# Patient Record
Sex: MALE | Race: BLACK OR AFRICAN AMERICAN | URBAN - METROPOLITAN AREA
[De-identification: names, ages, dates, MRNs, and addresses within clinical notes are randomized per-mention and may not be internally consistent; named-entity substitution may affect disease eponyms.]

---
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
## 2022-02-14 RX ORDER — CLOTRIMAZOLE AND BETAMETHASONE DIPROPIONATE 10; .64 MG/G; MG/G
1 CREAM TOPICAL 2 TIMES DAILY PRN
Qty: 45 G | Refills: 3 | Status: CANCELLED | OUTPATIENT
Start: 2022-02-14

## 2022-02-14 RX ORDER — CLOTRIMAZOLE AND BETAMETHASONE DIPROPIONATE 10; .64 MG/G; MG/G
1 CREAM TOPICAL 2 TIMES DAILY PRN
Qty: 45 G | Refills: 3 | Status: SHIPPED | OUTPATIENT
Start: 2022-02-14

## 2022-03-21 ENCOUNTER — NURSE TRIAGE (OUTPATIENT)
Dept: FAMILY MEDICINE CLINIC | Facility: CLINIC | Age: 22
End: 2022-03-21

## 2022-04-21 RX ORDER — CLOTRIMAZOLE AND BETAMETHASONE DIPROPIONATE 10; .64 MG/G; MG/G
1 CREAM TOPICAL 2 TIMES DAILY PRN
Qty: 45 G | Refills: 3 | Status: SHIPPED | OUTPATIENT
Start: 2022-04-21

## 2022-07-26 ENCOUNTER — NURSE TRIAGE (OUTPATIENT)
Dept: FAMILY MEDICINE CLINIC | Facility: CLINIC | Age: 22
End: 2022-07-26

## 2022-08-01 ENCOUNTER — OFFICE VISIT (OUTPATIENT)
Dept: FAMILY MEDICINE CLINIC | Facility: CLINIC | Age: 22
End: 2022-08-01
Payer: COMMERCIAL

## 2022-08-01 VITALS
TEMPERATURE: 98 F | DIASTOLIC BLOOD PRESSURE: 75 MMHG | HEART RATE: 78 BPM | WEIGHT: 144.19 LBS | HEIGHT: 72 IN | BODY MASS INDEX: 19.53 KG/M2 | SYSTOLIC BLOOD PRESSURE: 115 MMHG

## 2022-08-01 DIAGNOSIS — R10.13 EPIGASTRIC PAIN: ICD-10-CM

## 2022-08-01 DIAGNOSIS — R10.84 GENERALIZED ABDOMINAL PAIN: Primary | ICD-10-CM

## 2022-08-01 PROCEDURE — 3074F SYST BP LT 130 MM HG: CPT | Performed by: FAMILY MEDICINE

## 2022-08-01 PROCEDURE — 99214 OFFICE O/P EST MOD 30 MIN: CPT | Performed by: FAMILY MEDICINE

## 2022-08-01 PROCEDURE — 3008F BODY MASS INDEX DOCD: CPT | Performed by: FAMILY MEDICINE

## 2022-08-01 PROCEDURE — 3078F DIAST BP <80 MM HG: CPT | Performed by: FAMILY MEDICINE

## 2022-08-01 NOTE — PATIENT INSTRUCTIONS
H pylori test. Consider GI consultation. High suspicion for IBS. Next level of investigation will likely be to test for malabsorption including celiac disease, but doubt. Patient is been encouraged to purchase at least 1 weeks worth of a probiotic for general increase colonic health (Culturelle is a suggested brand). Patient can also add to his diet on a daily basis 1 compressed capsule of FiberCon orally daily. After 2 weeks of the initial trial of natural approach to better: Health, the patient has been advised that he can take 2 apple cider vinegar Gummies per day.

## 2022-08-02 ENCOUNTER — LAB ENCOUNTER (OUTPATIENT)
Dept: LAB | Age: 22
End: 2022-08-02
Attending: FAMILY MEDICINE
Payer: COMMERCIAL

## 2022-08-03 ENCOUNTER — TELEPHONE (OUTPATIENT)
Dept: FAMILY MEDICINE CLINIC | Facility: CLINIC | Age: 22
End: 2022-08-03

## 2022-08-03 DIAGNOSIS — L30.9 ECZEMA, UNSPECIFIED TYPE: Primary | ICD-10-CM

## 2022-08-03 DIAGNOSIS — L85.3 DRY SKIN DERMATITIS: ICD-10-CM

## 2022-08-03 NOTE — TELEPHONE ENCOUNTER
Patient is requesting a referral for an external dermatologist. His appointment is for tomorrow. Routing to Dr. Latisha Cook as Dr. Ángela Farmer is out of the office.  Referral pending your review and approval.     [facility]  [id number] [facility] [address]

## 2022-08-04 ENCOUNTER — APPOINTMENT (OUTPATIENT)
Dept: URBAN - METROPOLITAN AREA CLINIC 317 | Age: 22
Setting detail: DERMATOLOGY
End: 2022-08-04

## 2022-08-04 DIAGNOSIS — B36.0 PITYRIASIS VERSICOLOR: ICD-10-CM

## 2022-08-04 PROCEDURE — 99204 OFFICE O/P NEW MOD 45 MIN: CPT

## 2022-08-04 PROCEDURE — OTHER PRESCRIPTION: OTHER

## 2022-08-04 PROCEDURE — OTHER COUNSELING: OTHER

## 2022-08-04 PROCEDURE — OTHER PRESCRIPTION MEDICATION MANAGEMENT: OTHER

## 2022-08-04 RX ORDER — FLUCONAZOLE 150 MG/1
TABLET ORAL
Qty: 2 | Refills: 0 | Status: ERX | COMMUNITY
Start: 2022-08-04

## 2022-08-04 RX ORDER — KETOCONAZOLE 20 MG/G
CREAM TOPICAL
Qty: 60 | Refills: 2 | Status: ERX | COMMUNITY
Start: 2022-08-04

## 2022-08-04 ASSESSMENT — LOCATION DETAILED DESCRIPTION DERM
LOCATION DETAILED: LEFT SUPERIOR ANTERIOR NECK
LOCATION DETAILED: PERIUMBILICAL SKIN
LOCATION DETAILED: LEFT ANTECUBITAL SKIN
LOCATION DETAILED: RIGHT ANTECUBITAL SKIN
LOCATION DETAILED: RIGHT VENTRAL PROXIMAL FOREARM

## 2022-08-04 ASSESSMENT — LOCATION SIMPLE DESCRIPTION DERM
LOCATION SIMPLE: RIGHT FOREARM
LOCATION SIMPLE: LEFT ANTERIOR NECK
LOCATION SIMPLE: ABDOMEN
LOCATION SIMPLE: LEFT ELBOW
LOCATION SIMPLE: RIGHT ELBOW

## 2022-08-04 ASSESSMENT — LOCATION ZONE DERM
LOCATION ZONE: TRUNK
LOCATION ZONE: NECK
LOCATION ZONE: ARM

## 2022-08-04 NOTE — PROCEDURE: PRESCRIPTION MEDICATION MANAGEMENT
Initiate Treatment: Diflucem 150mg xQD; Take one tablet 1 hr before exercise. exercise/sweat x30min; allow sweats to dry on skin for 30min, then shower. repeat this regimen in 1 week. Ketoconozole 2% topical cream Bid x3-4 weeks, OTC CeraVe moisturizer
Detail Level: Zone
Render In Strict Bullet Format?: No

## 2022-08-23 ENCOUNTER — TELEPHONE (OUTPATIENT)
Dept: FAMILY MEDICINE CLINIC | Facility: CLINIC | Age: 22
End: 2022-08-23

## 2022-08-23 DIAGNOSIS — R14.0 BLOATING: Primary | ICD-10-CM

## 2022-08-23 NOTE — TELEPHONE ENCOUNTER
Patient is requesting referral.     Name of specialist and specialty department : GI  Reason for visit with the specialist: sensitive stomach and bloating referred by DR. Mariya Garcia  Address of the specialist office: [place]   Appointment date: waiting on referral         CSS informed patient the turnaround time for referral is 5-7 business days. Patient was informed to check their [email or web address] account for referral status.

## 2022-08-24 NOTE — TELEPHONE ENCOUNTER
Dr. Angie Spivey,    The patient is requesting a referral to a GI doctor at [place] [address] for bloating and sensitive stomach. Pended referral please review diagnosis and sign off if you agree. Thank you.   Jose Juan Perez

## 2022-11-04 ENCOUNTER — TELEPHONE (OUTPATIENT)
Dept: FAMILY MEDICINE CLINIC | Facility: CLINIC | Age: 22
End: 2022-11-04

## 2022-11-04 NOTE — TELEPHONE ENCOUNTER
Patient called stated having some anxiety/depression issue, currently away at school and needs appointment in December.     Reviewed red flags with patient    Denies; suicidal    Future Appointments   Date Time Provider Sparkle Becerril   12/30/2022  1:00 PM [place] MarchDO NORBERTO

## 2022-12-28 RX ORDER — CLOTRIMAZOLE AND BETAMETHASONE DIPROPIONATE 10; .64 MG/G; MG/G
1 CREAM TOPICAL 2 TIMES DAILY PRN
Qty: 45 G | Refills: 3 | Status: SHIPPED | OUTPATIENT
Start: 2022-12-28

## 2023-01-03 ENCOUNTER — TELEPHONE (OUTPATIENT)
Dept: FAMILY MEDICINE CLINIC | Facility: CLINIC | Age: 23
End: 2023-01-03

## 2023-01-03 RX ORDER — CLOTRIMAZOLE AND BETAMETHASONE DIPROPIONATE 10; .64 MG/G; MG/G
1 CREAM TOPICAL 2 TIMES DAILY PRN
Qty: 45 G | Refills: 3 | Status: SHIPPED | OUTPATIENT
Start: 2023-01-03

## 2023-01-03 NOTE — TELEPHONE ENCOUNTER
Per the pharmacy the patient is asking that the medication be transferred to [place].     Medication sent to the new pharmacy

## 2023-01-05 ENCOUNTER — TELEPHONE (OUTPATIENT)
Dept: FAMILY MEDICINE CLINIC | Facility: CLINIC | Age: 23
End: 2023-01-05

## 2023-01-05 NOTE — TELEPHONE ENCOUNTER
Per Nick, a prior authorization has been started for patient's Paroxetine HCI ER 12.5mg ER Tablets. Login to [email or web address] and click \"Enter a Key\". Enter the patient's last name, date of birth and the Key:    Key: [id number]    Patient Last Name: Demetrius Coppola    : 2000    Complete the prior authorization and click \"Send to Plan\" for approval. Please notify Nick when a determination has been received from the plan.

## 2023-01-11 ENCOUNTER — TELEPHONE (OUTPATIENT)
Dept: FAMILY MEDICINE CLINIC | Facility: CLINIC | Age: 23
End: 2023-01-11

## 2023-01-11 NOTE — TELEPHONE ENCOUNTER
Patient stated that he had a televisit with Dr Brooklynn Grady on 12/30/2022 and was prescribed an antidepressant- paroxetine. Recently just got over COVID symptoms. Has been on the new medication for 3 days and has noticed that he has been getting headaches occasionally, pressure in his head, feeling alittle anxious, nauseated, and has vomited. Was reading up on the side effects that could be related to medication. Patient wanted to get a note for work to make sure just in case he is having symptoms he is covered. Patient does not really want his employer to know that he is on an antidepressant. Advised patient that he should get FMLA paperwork from his employer to have doctor to complete that way if he is having symptoms related to medication/depression his job would be ok. Patient verbalized understanding. Gave patient number/fax number to forms department.

## 2023-02-07 DIAGNOSIS — F42.2 MIXED OBSESSIONAL THOUGHTS AND ACTS: ICD-10-CM

## 2023-02-07 DIAGNOSIS — F32.A ANXIETY AND DEPRESSION: ICD-10-CM

## 2023-02-07 DIAGNOSIS — F41.9 ANXIETY AND DEPRESSION: ICD-10-CM

## 2023-02-28 RX ORDER — CLOTRIMAZOLE AND BETAMETHASONE DIPROPIONATE 10; .64 MG/G; MG/G
1 CREAM TOPICAL 2 TIMES DAILY PRN
Qty: 45 G | Refills: 3 | Status: SHIPPED | OUTPATIENT
Start: 2023-02-28

## 2023-02-28 RX ORDER — PAROXETINE HYDROCHLORIDE HEMIHYDRATE 12.5 MG/1
12.5 TABLET, FILM COATED, EXTENDED RELEASE ORAL EVERY MORNING
Qty: 90 TABLET | Refills: 1 | Status: SHIPPED | OUTPATIENT
Start: 2023-02-28

## 2023-02-28 NOTE — TELEPHONE ENCOUNTER
Called patient, confirmed name and . Informed him of below and gave phone number for [facility]. Patient verbalized understanding and agrees. He is also requesting that clotrimazole be sent to [facility].

## 2023-02-28 NOTE — TELEPHONE ENCOUNTER
Refill passed per [place] [employer] protocol. Requested Prescriptions   Pending Prescriptions Disp Refills    PARoxetine HCl ER 12.5 MG Oral Tablet 24 Hr 90 tablet 1     Sig: Take 1 tablet (12.5 mg total) by mouth every morning.        Psychiatric Non-Scheduled (Anti-Anxiety) Passed - 2/28/2023  4:33 PM        Passed - In person appointment or virtual visit in the past 6 mos or appointment in next 3 mos     Recent Outpatient Visits              2 months ago Anxiety and depression    [facility], [address], DO    Telemedicine    7 months ago Generalized abdominal pain    [facility], [address], [address], DO    Office Visit    1 year ago Eczema, unspecified type    [facility] [address] [facility], [address], DO    Office Visit    1 year ago Eczema, unspecified type    [address] [facility], [address], DO    Telemedicine    2 years ago Encounter for PPD skin test reading    [address], [place] [address], [facility]    Nurse Only

## 2023-02-28 NOTE — TELEPHONE ENCOUNTER
Patient calling, confirmed name and . He states that he believes that he is out of refills. He confirms his [facility] in [place]. Called patient's pharmacy, confirmed patient's name and . PharmD explains that his insurance will not cover refills at [facility] anymore. The patient is advised to use [employer]  Mail order. Last refill was on  therefore he will be out of medication soon. Patient can also call [phone] with pharmacy questions.

## 2023-02-28 NOTE — TELEPHONE ENCOUNTER
Please review. No protocol. Requested Prescriptions   Pending Prescriptions Disp Refills    clotrimazole-betamethasone 1-0.05 % External Cream 45 g 3     Sig: Apply 1 Application. topically 2 (two) times daily as needed. There is no refill protocol information for this order      Signed Prescriptions Disp Refills    PARoxetine HCl ER 12.5 MG Oral Tablet 24 Hr 90 tablet 1     Sig: Take 1 tablet (12.5 mg total) by mouth every morning.        Psychiatric Non-Scheduled (Anti-Anxiety) Passed - 2/28/2023  4:33 PM        Passed - In person appointment or virtual visit in the past 6 mos or appointment in next 3 mos     Recent Outpatient Visits              2 months ago Anxiety and depression    [address] [place] [address], [address], DO    Telemedicine    7 months ago Generalized abdominal pain    [facility], [address], [address], DO    Office Visit    1 year ago Eczema, unspecified type    [address] [place] [address], [address], DO    Office Visit    1 year ago Eczema, unspecified type    [address] [place] [address], [address], DO    Telemedicine    2 years ago Encounter for PPD skin test reading    [address], [place] [address], [facility]    Nurse Only

## 2025-02-25 ENCOUNTER — TELEPHONE (OUTPATIENT)
Dept: FAMILY MEDICINE CLINIC | Facility: CLINIC | Age: 25
End: 2025-02-25

## 2025-02-25 NOTE — TELEPHONE ENCOUNTER
Name and  verified.     The patient called stating he would like to get back onto paroxetine. He was last prescribed it In 2023. He has not been seen since a telemedicine visit in 2023. He is currently in college out of [place] and is asking if he can have a telemedicine visit to discuss getting back on his medication.     Booked an appointment for  with Judi Galvin for telemedicine due to minimal availability. Informed him we will need to send a message to the physician to ensure it is ok he has a telemedicine visit for this while he is out of state at college and since he has not been seen since . and informed him we may need to cancel it and he may need to see someone where he was at, he was understandable and agreeable.     Judi Galvin please advise, would you be able to see this patient through a telemedicine appointment to discuss getting back on his paroxetine? Thank you so much.

## 2025-02-26 NOTE — TELEPHONE ENCOUNTER
[place] in [place]. Recommend patient follow up with school health center or local clinic. If not, I am happy to discuss with the patient virtual or in person when he is home from school. - NILTON Allen

## 2025-06-20 ENCOUNTER — OFFICE VISIT (OUTPATIENT)
Dept: FAMILY MEDICINE CLINIC | Facility: CLINIC | Age: 25
End: 2025-06-20

## 2025-06-20 VITALS
RESPIRATION RATE: 16 BRPM | SYSTOLIC BLOOD PRESSURE: 112 MMHG | DIASTOLIC BLOOD PRESSURE: 69 MMHG | WEIGHT: 143 LBS | HEIGHT: 72 IN | HEART RATE: 108 BPM | BODY MASS INDEX: 19.37 KG/M2

## 2025-06-20 DIAGNOSIS — F32.A ANXIETY AND DEPRESSION: ICD-10-CM

## 2025-06-20 DIAGNOSIS — F41.9 ANXIETY AND DEPRESSION: ICD-10-CM

## 2025-06-20 DIAGNOSIS — Z28.21 TETANUS, DIPHTHERIA, AND ACELLULAR PERTUSSIS (TDAP) VACCINATION DECLINED: ICD-10-CM

## 2025-06-20 DIAGNOSIS — Z00.00 ROUTINE PHYSICAL EXAMINATION: Primary | ICD-10-CM

## 2025-06-20 DIAGNOSIS — Z11.3 ROUTINE SCREENING FOR STI (SEXUALLY TRANSMITTED INFECTION): ICD-10-CM

## 2025-06-20 DIAGNOSIS — Z28.21 MENINGOCOCCAL VACCINATION DECLINED: ICD-10-CM

## 2025-06-20 LAB
ALBUMIN SERPL-MCNC: 5.1 G/DL (ref 3.2–4.8)
ALBUMIN/GLOB SERPL: 2.4 {RATIO} (ref 1–2)
ALP LIVER SERPL-CCNC: 87 U/L (ref 45–117)
ALT SERPL-CCNC: 25 U/L (ref 10–49)
ANION GAP SERPL CALC-SCNC: 5 MMOL/L (ref 0–18)
AST SERPL-CCNC: 20 U/L (ref ?–34)
BASOPHILS # BLD AUTO: 0.06 X10(3) UL (ref 0–0.2)
BASOPHILS NFR BLD AUTO: 0.8 %
BILIRUB SERPL-MCNC: 0.5 MG/DL (ref 0.3–1.2)
BILIRUB UR QL: NEGATIVE
BUN BLD-MCNC: 11 MG/DL (ref 9–23)
BUN/CREAT SERPL: 11.1 (ref 10–20)
CALCIUM BLD-MCNC: 10.5 MG/DL (ref 8.7–10.4)
CHLORIDE SERPL-SCNC: 106 MMOL/L (ref 98–112)
CHOLEST SERPL-MCNC: 184 MG/DL (ref ?–200)
CLARITY UR: CLEAR
CO2 SERPL-SCNC: 28 MMOL/L (ref 21–32)
COLOR UR: COLORLESS
CREAT BLD-MCNC: 0.99 MG/DL (ref 0.7–1.3)
DEPRECATED RDW RBC AUTO: 39.5 FL (ref 35.1–46.3)
EGFRCR SERPLBLD CKD-EPI 2021: 110 ML/MIN/1.73M2 (ref 60–?)
EOSINOPHIL # BLD AUTO: 0.07 X10(3) UL (ref 0–0.7)
EOSINOPHIL NFR BLD AUTO: 0.9 %
ERYTHROCYTE [DISTWIDTH] IN BLOOD BY AUTOMATED COUNT: 12.5 % (ref 11–15)
FASTING PATIENT LIPID ANSWER: NO
FASTING STATUS PATIENT QL REPORTED: NO
GLOBULIN PLAS-MCNC: 2.1 G/DL (ref 2–3.5)
GLUCOSE BLD-MCNC: 71 MG/DL (ref 70–99)
GLUCOSE UR-MCNC: NORMAL MG/DL
HAV AB SER QL IA: NONREACTIVE
HBV CORE AB SERPL QL IA: NONREACTIVE
HBV SURFACE AB SER QL: NONREACTIVE
HBV SURFACE AB SERPL IA-ACNC: <3.1 MIU/ML
HBV SURFACE AG SERPL QL IA: NONREACTIVE
HCT VFR BLD AUTO: 46.9 % (ref 39–53)
HCV AB SERPL QL IA: NONREACTIVE
HDLC SERPL-MCNC: 86 MG/DL (ref 40–59)
HGB BLD-MCNC: 15 G/DL (ref 13–17.5)
HGB UR QL STRIP.AUTO: NEGATIVE
IMM GRANULOCYTES # BLD AUTO: 0.02 X10(3) UL (ref 0–1)
IMM GRANULOCYTES NFR BLD: 0.3 %
KETONES UR-MCNC: NEGATIVE MG/DL
LDLC SERPL CALC-MCNC: 88 MG/DL (ref ?–100)
LEUKOCYTE ESTERASE UR QL STRIP.AUTO: NEGATIVE
LYMPHOCYTES # BLD AUTO: 0.87 X10(3) UL (ref 1–4)
LYMPHOCYTES NFR BLD AUTO: 11.7 %
MCH RBC QN AUTO: 27.9 PG (ref 26–34)
MCHC RBC AUTO-ENTMCNC: 32 G/DL (ref 31–37)
MCV RBC AUTO: 87.3 FL (ref 80–100)
MONOCYTES # BLD AUTO: 0.6 X10(3) UL (ref 0.1–1)
MONOCYTES NFR BLD AUTO: 8.1 %
NEUTROPHILS # BLD AUTO: 5.81 X10 (3) UL (ref 1.5–7.7)
NEUTROPHILS # BLD AUTO: 5.81 X10(3) UL (ref 1.5–7.7)
NEUTROPHILS NFR BLD AUTO: 78.2 %
NITRITE UR QL STRIP.AUTO: NEGATIVE
NONHDLC SERPL-MCNC: 98 MG/DL (ref ?–130)
OSMOLALITY SERPL CALC.SUM OF ELEC: 286 MOSM/KG (ref 275–295)
PH UR: 7.5 [PH] (ref 5–8)
PLATELET # BLD AUTO: 370 10(3)UL (ref 150–450)
POTASSIUM SERPL-SCNC: 4.2 MMOL/L (ref 3.5–5.1)
PROT SERPL-MCNC: 7.2 G/DL (ref 5.7–8.2)
PROT UR-MCNC: NEGATIVE MG/DL
RBC # BLD AUTO: 5.37 X10(6)UL (ref 4.3–5.7)
SODIUM SERPL-SCNC: 139 MMOL/L (ref 136–145)
SP GR UR STRIP: 1.01 (ref 1–1.03)
T PALLIDUM AB SER QL IA: NONREACTIVE
T3FREE SERPL-MCNC: 3.19 PG/ML (ref 2.4–4.2)
T4 FREE SERPL-MCNC: 1.1 NG/DL (ref 0.8–1.7)
TRIGL SERPL-MCNC: 48 MG/DL (ref 30–149)
TSI SER-ACNC: 0.36 UIU/ML (ref 0.55–4.78)
UROBILINOGEN UR STRIP-ACNC: NORMAL
VIT B12 SERPL-MCNC: 452 PG/ML (ref 211–911)
VIT D+METAB SERPL-MCNC: 36.9 NG/ML (ref 30–100)
VLDLC SERPL CALC-MCNC: 8 MG/DL (ref 0–30)
WBC # BLD AUTO: 7.4 X10(3) UL (ref 4–11)

## 2025-06-20 PROCEDURE — 86704 HEP B CORE ANTIBODY TOTAL: CPT | Performed by: FAMILY MEDICINE

## 2025-06-20 PROCEDURE — 82607 VITAMIN B-12: CPT | Performed by: FAMILY MEDICINE

## 2025-06-20 PROCEDURE — 86780 TREPONEMA PALLIDUM: CPT | Performed by: FAMILY MEDICINE

## 2025-06-20 PROCEDURE — 80053 COMPREHEN METABOLIC PANEL: CPT | Performed by: FAMILY MEDICINE

## 2025-06-20 PROCEDURE — 86803 HEPATITIS C AB TEST: CPT | Performed by: FAMILY MEDICINE

## 2025-06-20 PROCEDURE — 86708 HEPATITIS A ANTIBODY: CPT | Performed by: FAMILY MEDICINE

## 2025-06-20 PROCEDURE — 82306 VITAMIN D 25 HYDROXY: CPT | Performed by: FAMILY MEDICINE

## 2025-06-20 PROCEDURE — 87491 CHLMYD TRACH DNA AMP PROBE: CPT | Performed by: FAMILY MEDICINE

## 2025-06-20 PROCEDURE — 80061 LIPID PANEL: CPT | Performed by: FAMILY MEDICINE

## 2025-06-20 PROCEDURE — 87591 N.GONORRHOEAE DNA AMP PROB: CPT | Performed by: FAMILY MEDICINE

## 2025-06-20 PROCEDURE — 84439 ASSAY OF FREE THYROXINE: CPT | Performed by: FAMILY MEDICINE

## 2025-06-20 PROCEDURE — 87340 HEPATITIS B SURFACE AG IA: CPT | Performed by: FAMILY MEDICINE

## 2025-06-20 PROCEDURE — 84481 FREE ASSAY (FT-3): CPT | Performed by: FAMILY MEDICINE

## 2025-06-20 PROCEDURE — 84443 ASSAY THYROID STIM HORMONE: CPT | Performed by: FAMILY MEDICINE

## 2025-06-20 PROCEDURE — 85025 COMPLETE CBC W/AUTO DIFF WBC: CPT | Performed by: FAMILY MEDICINE

## 2025-06-20 PROCEDURE — 86706 HEP B SURFACE ANTIBODY: CPT | Performed by: FAMILY MEDICINE

## 2025-06-20 PROCEDURE — 81003 URINALYSIS AUTO W/O SCOPE: CPT | Performed by: FAMILY MEDICINE

## 2025-06-20 PROCEDURE — 87389 HIV-1 AG W/HIV-1&-2 AB AG IA: CPT | Performed by: FAMILY MEDICINE

## 2025-06-20 PROCEDURE — 80503 PATH CLIN CONSLTJ SF 5-20: CPT | Performed by: FAMILY MEDICINE

## 2025-06-20 NOTE — PATIENT INSTRUCTIONS
All adult screening ordered and done appropriate for patient's age and gender and risk factors and complaints.  Patient counseled on the importance of abstinence and if sex occurs of any type condoms should be used every single time. The reality of unwanted pregnancy and all STD including HIV were emphasized.  Encouraged physical fitness and daily physical activity daily.  Patient would benefit from behavioral counseling.

## 2025-06-22 DIAGNOSIS — R79.89 ABNORMAL TSH: ICD-10-CM

## 2025-06-22 DIAGNOSIS — E88.09 HYPERPROTEINEMIA: Primary | ICD-10-CM

## 2025-06-22 DIAGNOSIS — E83.52 HYPERCALCEMIA: ICD-10-CM

## 2025-06-23 ENCOUNTER — TELEPHONE (OUTPATIENT)
Dept: FAMILY MEDICINE CLINIC | Facility: CLINIC | Age: 25
End: 2025-06-23

## 2025-06-23 DIAGNOSIS — R09.81 NOSE CONGESTION: Primary | ICD-10-CM

## 2025-06-23 DIAGNOSIS — R09.81 NASAL SINUS CONGESTION: ICD-10-CM

## 2025-06-23 LAB
C TRACH DNA SPEC QL NAA+PROBE: NEGATIVE
N GONORRHOEA DNA SPEC QL NAA+PROBE: NEGATIVE

## 2025-06-23 NOTE — TELEPHONE ENCOUNTER
Dr. Godwin patient stated that he was in to see you on 6/20/25 and he wants to see a specialist for his stuffy nose before he leaves to [place] on Saturday. Per patient he had mentioned it to you at the office visit. Patient will like to see a ENT. I have pended the referral for your review and approval.    RN when calling patient back with  message please  let him know that the labs are order for him to have them in 3 months a office visit is not needed.

## 2025-06-23 NOTE — PROGRESS NOTES
Subjective:     Patient ID: Nitin Mills Jr. is a 25 year old male.    This patient is a 25-year-old well-established -American gentleman here for complete preventive care physical and for status update on any confirmed chronic medical illnesses and follow up on any previous labs or procedures that were suggestive or in need of further work up. Colonoscopy is current. Bowel, bladder, and sexual function are intact.    Patient also here for STI screening.  Patient is currently asymptomatic.    Patient declines on Tdap and meningococcal B vaccine.    Patient also concerned about persistent and seemingly worsening anxiety and also depression.  The patient has had some remote sessions concerning his mental health, but patient states that these are ineffective.  The patient's residence right now is still in [place] and it would be very difficult to set him up to see a mental health provider here in [place].  Patient encouraged to establish consistent mental health care in [place].  I did let the patient know that I would try to see if I have any colleagues in any capacity and mental health in the [place] region and where she lives.        History/Other:   Review of Systems  Current Medications[1]  Allergies:Allergies[2]    Past Medical History[3]   Past Surgical History[4]   Family History[5]   Social History: Short Social Hx on File[6]     Objective:   Vitals:    06/20/25 1114   BP: 112/69   Pulse: 108   Resp: 16       Physical Exam  Constitutional:       Appearance: Normal appearance.   HENT:      Right Ear: Tympanic membrane normal.      Left Ear: Tympanic membrane normal.      Nose: Nose normal.      Mouth/Throat:      Mouth: Mucous membranes are moist.   Neck:      Thyroid: No thyromegaly.   Cardiovascular:      Rate and Rhythm: Normal rate and regular rhythm.      Heart sounds: Normal heart sounds.   Pulmonary:      Effort: Pulmonary effort is normal. No respiratory distress.      Breath sounds: Normal  breath sounds.   Abdominal:      General: Abdomen is flat.      Palpations: Abdomen is soft. There is no mass.   Neurological:      General: No focal deficit present.      Mental Status: He is alert and oriented to person, place, and time.   Psychiatric:      Comments: A very covered anxiousness.         Assessment & Plan:   1. Routine physical examination  The following has been ordered.  Well exam aside from the assessments entered below.  - CBC With Differential With Platelet; Future  - Comp Metabolic Panel (14); Future  - Lipid Panel; Future  - TSH W Reflex To Free T4; Future  - Urinalysis, Routine; Future  - CBC With Differential With Platelet  - Comp Metabolic Panel (14)  - Lipid Panel  - TSH W Reflex To Free T4  - Urinalysis, Routine  - Vitamin B12 [E]; Future  - Vitamin D [E]; Future  - Vitamin B12 [E]  - Vitamin D [E]  - T4, FREE (S)  - Free T3 (Triiodothryronine)    2. Anxiety and depression  Mental health counseling is highly recommended.    3. Routine screening for STI (sexually transmitted infection)  Ordered.  Asymptomatic.  - HIV AG AB Combo [E]; Future  - T Pallidum Screening Cascade; Future  - Hepatitis A B + C profile [E]; Future  - Chlamydia/Gc Amplification; Future  - HIV AG AB Combo [E]  - T Pallidum Screening Cascade  - Hepatitis A B + C profile [E]  - Chlamydia/Gc Amplification    4. Tetanus, diphtheria, and acellular pertussis (Tdap) vaccination declined  Declined by patient.  - TETANUS, DIPHTHERIA TOXOIDS AND ACELLULAR PERTUSIS VACCINE (TDAP), >7 YEARS, IM USE    5. Meningococcal vaccination declined  Declined by patient.  - BEXSERO, MENINGOCOCCAL B VACCINE      Orders Placed This Encounter   Procedures    CBC With Differential With Platelet    Comp Metabolic Panel (14)    Lipid Panel    TSH W Reflex To Free T4    Urinalysis, Routine    HIV AG AB Combo [E]    T Pallidum Screening Beggs    Hepatitis A B + C profile [E]    HIV Ag/Ab Combo    Vitamin B12 [E]    Vitamin D [E]    Vitamin D,  25-Hydroxy    T4, FREE (S)    Free T3 (Triiodothryronine)    TETANUS, DIPHTHERIA TOXOIDS AND ACELLULAR PERTUSIS VACCINE (TDAP), >7 YEARS, IM USE    BEXSERO, MENINGOCOCCAL B VACCINE    Chlamydia/Gc Amplification       Meds This Visit:  Requested Prescriptions      No prescriptions requested or ordered in this encounter       Imaging & Referrals:  TETANUS, DIPHTHERIA TOXOIDS AND ACELLULAR PERTUSIS VACCINE (TDAP), >7 YEARS, IM USE  SEROGROUP B MENINGOCOCCAL (MENB) 2 DOSE SCHEDULE     Patient Instructions   All adult screening ordered and done appropriate for patient's age and gender and risk factors and complaints.  Patient counseled on the importance of abstinence and if sex occurs of any type condoms should be used every single time. The reality of unwanted pregnancy and all STD including HIV were emphasized.  Encouraged physical fitness and daily physical activity daily.  Patient would benefit from behavioral counseling.    Return in about 1 year (around 6/20/2026), or if symptoms worsen or fail to improve.         [1]   No current outpatient medications on file.   [2] No Known Allergies  [3] No past medical history on file.  [4] No past surgical history on file.  [5] No family history on file.  [6]   Social History  Socioeconomic History    Marital status: Single   Tobacco Use    Smoking status: Never     Passive exposure: Never    Smokeless tobacco: Never   Vaping Use    Vaping status: Never Used   Substance and Sexual Activity    Alcohol use: Yes     Alcohol/week: 1.0 standard drink of alcohol     Types: 1 Glasses of wine per week    Drug use: Yes     Types: Cannabis   Other Topics Concern    Caffeine Concern No     Social Drivers of Health     Food Insecurity: No Food Insecurity (6/20/2025)    NCSS - Food Insecurity     Worried About Running Out of Food in the Last Year: No     Ran Out of Food in the Last Year: No   Transportation Needs: No Transportation Needs (6/20/2025)    NCSS - Transportation     Lack of  Transportation: No   Housing Stability: Not At Risk (6/20/2025)    NCSS - Housing/Utilities     Has Housing: Yes     Worried About Losing Housing: No     Unable to Get Utilities: No

## 2025-06-23 NOTE — TELEPHONE ENCOUNTER
Patient calling (verified full name and date of birth).  Relayed Dr. Godwin message and patient voiced understanding with treatment plan  Regarding pending referral and future lab orders  Patient aware  still needs to authorize referral    Fatimah Sun MD [address] [place] [address] [id number] [phone]

## 2025-06-27 ENCOUNTER — TELEPHONE (OUTPATIENT)
Dept: FAMILY MEDICINE CLINIC | Facility: CLINIC | Age: 25
End: 2025-06-27

## 2025-06-27 NOTE — TELEPHONE ENCOUNTER
Please see encounter from 6/22/2025. Message has been sent to Dr. Godwin. I will close this encounter.

## 2025-06-27 NOTE — TELEPHONE ENCOUNTER
Patient called and said he spoke to Fior yesterday about having medication below changed to a pharmacy in [place] but I saw no encounter, patient is still needing medication to be sent  updated pharmacy     [id number] - [place], [place] - [facility] [address] AT [facility],       Medication Detail    Medication Quantity Refills Start End   LORazepam 0.5 MG Oral Tab 60 tablet 0 6/26/2025 --   Sig:   Take 1 tablet (0.5 mg total) by mouth 2 (two) times daily as needed for Anxiety.     Route:   Oral     PRN Reason(s):   Anxiety     Order #:   [id number]

## 2025-07-25 ENCOUNTER — LAB ENCOUNTER (OUTPATIENT)
Dept: LAB | Age: 25
End: 2025-07-25
Attending: SPECIALIST
Payer: COMMERCIAL

## 2025-07-25 ENCOUNTER — OFFICE VISIT (OUTPATIENT)
Dept: OTOLARYNGOLOGY | Facility: CLINIC | Age: 25
End: 2025-07-25

## 2025-07-25 VITALS — WEIGHT: 143 LBS | BODY MASS INDEX: 19.37 KG/M2 | HEIGHT: 72 IN

## 2025-07-25 DIAGNOSIS — H61.23 CERUMEN DEBRIS ON TYMPANIC MEMBRANE OF BOTH EARS: ICD-10-CM

## 2025-07-25 DIAGNOSIS — J30.9 ALLERGIC RHINITIS WITH POSTNASAL DRIP: ICD-10-CM

## 2025-07-25 DIAGNOSIS — R09.82 ALLERGIC RHINITIS WITH POSTNASAL DRIP: Primary | ICD-10-CM

## 2025-07-25 DIAGNOSIS — J30.9 ALLERGIC RHINITIS WITH POSTNASAL DRIP: Primary | ICD-10-CM

## 2025-07-25 DIAGNOSIS — R09.82 ALLERGIC RHINITIS WITH POSTNASAL DRIP: ICD-10-CM

## 2025-07-25 PROCEDURE — 3008F BODY MASS INDEX DOCD: CPT | Performed by: SPECIALIST

## 2025-07-25 PROCEDURE — 69210 REMOVE IMPACTED EAR WAX UNI: CPT | Performed by: SPECIALIST

## 2025-07-25 PROCEDURE — 36415 COLL VENOUS BLD VENIPUNCTURE: CPT

## 2025-07-25 PROCEDURE — 82785 ASSAY OF IGE: CPT

## 2025-07-25 PROCEDURE — 86003 ALLG SPEC IGE CRUDE XTRC EA: CPT

## 2025-07-25 PROCEDURE — 99203 OFFICE O/P NEW LOW 30 MIN: CPT | Performed by: SPECIALIST

## 2025-07-25 RX ORDER — AZELASTINE 1 MG/ML
2 SPRAY, METERED NASAL 2 TIMES DAILY
Qty: 30 ML | Refills: 5 | Status: SHIPPED | OUTPATIENT
Start: 2025-07-25

## 2025-07-26 NOTE — PATIENT INSTRUCTIONS
Your septum was not deviated.  There was also no purulence or polyps.  I sent you for allergy testing.  I will of course notify you of all results.  I placed you on a trial of Astelin nasal spray you can use this 2 sprays to each nostril up to twice daily.  Cerumen was fully cleaned from both of your ears.

## 2025-07-26 NOTE — PROGRESS NOTES
Nitin Mills Jr. is a 25 year old male.   Chief Complaint   Patient presents with    Nasal Congestion     Patient having trouble nasal congestion and trouble breathing.     HPI:   Patient here is having difficulty breathing through his bilateral nares.  This has been worse in the past 6 months.  He had a history of trauma but this was in eighth grade.    Current Medications[1]   Past Medical History[2]   Social History:  Short Social Hx on File[3]     REVIEW OF SYSTEMS:   GENERAL HEALTH: feels well otherwise  GENERAL : denies fever, chills, sweats, weight loss, weight gain  SKIN: denies any unusual skin lesions or rashes  RESPIRATORY: denies shortness of breath with exertion  NEURO: denies headaches    EXAM:   Ht 6' (1.829 m)   Wt 143 lb (64.9 kg)   BMI 19.39 kg/m²   System Details   Skin Inspection - Normal.   Constitutional Overall appearance - Normal.   Head/Face Facial features - Normal. Eyebrows - Normal. Skull - Normal.   Eyes Conjunctiva - Right: Normal, Left: Normal. Pupil - Right: Normal, Left: Normal.    Ears Inspection - Right: Normal, Left: Normal.   Ears = bilateral cerumen occlussions.    Fully cleaned under microscope using instrumentation and suctioning.    Normal tympanic membranes.     Nasal External nose - Normal.   Nasal septum -straight  Turbinates -congestion, no purulence or polyps noted.   Oral/Oropharynx Lips - Normal, Tonsils - Normal, Tongue - Normal    Neck Exam Inspection - Normal. Palpation - Normal. Parotid gland - Normal. Thyroid gland - Normal.   Lymph Detail Submental. Submandibular. Anterior cervical. Posterior cervical. Supraclavicular all without enlargement   Psychiatric Orientation - Oriented to time, place, person & situation. Appropriate mood and affect.   Neurological Memory - Normal. Cranial nerves - Cranial nerves II through XII grossly intact.     ASSESSMENT AND PLAN:   1. Allergic rhinitis with postnasal drip  Congested turbinates.  Most consistent with allergic  rhinitis.  Allergy testing ordered.  I will notify patient of the results.  I placed him on a trial of Astelin nasal spray 2 sprays to each nostril up to twice daily.  - Allergens, Zone 8; Future    2. Cerumen debris on tympanic membrane of both ears  Fully cleaned.  Bilateral complete occlusions.  - Removal Impacted Cerumen Requiring Instrumentation, Bilateral      The patient indicates understanding of these issues and agrees to the plan.      Fatimah Sun MD  7/25/2025  7:49 PM         [1]   Current Outpatient Medications   Medication Sig Dispense Refill    azelastine 0.1 % Nasal Solution 2 sprays by Nasal route 2 (two) times daily. 30 mL 5    LORazepam 0.5 MG Oral Tab Take 1 tablet (0.5 mg total) by mouth 2 (two) times daily as needed for Anxiety. 60 tablet 0   [2] History reviewed. No pertinent past medical history.  [3]   Social History  Socioeconomic History    Marital status: Single   Tobacco Use    Smoking status: Never     Passive exposure: Never    Smokeless tobacco: Never   Vaping Use    Vaping status: Never Used   Substance and Sexual Activity    Alcohol use: Yes     Alcohol/week: 1.0 standard drink of alcohol     Types: 1 Glasses of wine per week    Drug use: Yes     Types: Cannabis   Other Topics Concern    Caffeine Concern No     Social Drivers of Health     Food Insecurity: No Food Insecurity (6/20/2025)    NCSS - Food Insecurity     Worried About Running Out of Food in the Last Year: No     Ran Out of Food in the Last Year: No   Transportation Needs: No Transportation Needs (6/20/2025)    NCSS - Transportation     Lack of Transportation: No   Housing Stability: Not At Risk (6/20/2025)    NCSS - Housing/Utilities     Has Housing: Yes     Worried About Losing Housing: No     Unable to Get Utilities: No

## 2025-07-28 ENCOUNTER — RESULTS FOLLOW-UP (OUTPATIENT)
Dept: OTOLARYNGOLOGY | Facility: CLINIC | Age: 25
End: 2025-07-28

## 2025-07-28 LAB
A ALTERNATA IGE QN: 1.94 KUA/L (ref ?–0.1)
A FUMIGATUS IGE QN: 0.46 KUA/L (ref ?–0.1)
AMER SYCAMORE IGE QN: <0.1 KUA/L (ref ?–0.1)
BERMUDA GRASS IGE QN: <0.1 KUA/L (ref ?–0.1)
BOXELDER IGE QN: <0.1 KUA/L (ref ?–0.1)
C HERBARUM IGE QN: <0.1 KUA/L (ref ?–0.1)
CALIF WALNUT IGE QN: 0.59 KUA/L (ref ?–0.1)
CAT DANDER IGE QN: <0.1 KUA/L (ref ?–0.1)
CMN PIGWEED IGE QN: <0.1 KUA/L (ref ?–0.1)
COMMON RAGWEED IGE QN: 8.36 KUA/L (ref ?–0.1)
COTTONWOOD IGE QN: 0.24 KUA/L (ref ?–0.1)
D FARINAE IGE QN: <0.1 KUA/L (ref ?–0.1)
D PTERONYSS IGE QN: <0.1 KUA/L (ref ?–0.1)
DOG DANDER IGE QN: <0.1 KUA/L (ref ?–0.1)
IGE SERPL-ACNC: 255 KU/L (ref 2–214)
M RACEMOSUS IGE QN: <0.1 KUA/L (ref ?–0.1)
MARSH ELDER IGE QN: 0.28 KUA/L (ref ?–0.1)
MOUSE EPITH IGE QN: <0.1 KUA/L (ref ?–0.1)
MT JUNIPER IGE QN: <0.1 KUA/L (ref ?–0.1)
P NOTATUM IGE QN: 0.13 KUA/L (ref ?–0.1)
PECAN/HICK TREE IGE QN: 1.08 KUA/L (ref ?–0.1)
ROACH IGE QN: <0.1 KUA/L (ref ?–0.1)
SALTWORT IGE QN: <0.1 KUA/L (ref ?–0.1)
SILVER BIRCH IGE QN: 0.1 KUA/L (ref ?–0.1)
TIMOTHY IGE QN: <0.1 KUA/L (ref ?–0.1)
WHITE ASH IGE QN: <0.1 KUA/L (ref ?–0.1)
WHITE ELM IGE QN: <0.1 KUA/L (ref ?–0.1)
WHITE MULBERRY IGE QN: <0.1 KUA/L (ref ?–0.1)
WHITE OAK IGE QN: 0.1 KUA/L (ref ?–0.1)

## 2025-07-31 RX ORDER — AZELASTINE 1 MG/ML
2 SPRAY, METERED NASAL 2 TIMES DAILY
Qty: 30 ML | Refills: 5 | Status: SHIPPED | OUTPATIENT
Start: 2025-07-31

## 2025-08-05 ENCOUNTER — TELEPHONE (OUTPATIENT)
Dept: FAMILY MEDICINE CLINIC | Facility: CLINIC | Age: 25
End: 2025-08-05

## 2025-08-05 RX ORDER — ESCITALOPRAM OXALATE 5 MG/1
5 TABLET ORAL DAILY
Qty: 30 TABLET | Refills: 0 | Status: SHIPPED | OUTPATIENT
Start: 2025-08-05

## 2025-08-30 ENCOUNTER — LAB ENCOUNTER (OUTPATIENT)
Dept: LAB | Facility: HOSPITAL | Age: 25
End: 2025-08-30
Attending: FAMILY MEDICINE

## 2025-08-30 DIAGNOSIS — R79.89 ABNORMAL TSH: ICD-10-CM

## 2025-08-30 DIAGNOSIS — E83.52 HYPERCALCEMIA: ICD-10-CM

## 2025-08-30 LAB
CALCIUM BLD-MCNC: 10.1 MG/DL (ref 8.7–10.4)
CREAT BLD-MCNC: 0.95 MG/DL (ref 0.7–1.3)
PHOSPHATE SERPL-MCNC: 3.4 MG/DL (ref 2.4–5.1)
PTH-INTACT SERPL-MCNC: 54.7 PG/ML (ref 18.5–88)
TSI SER-ACNC: 0.63 UIU/ML (ref 0.55–4.78)

## 2025-08-30 PROCEDURE — 82310 ASSAY OF CALCIUM: CPT

## 2025-08-30 PROCEDURE — 84443 ASSAY THYROID STIM HORMONE: CPT

## 2025-08-30 PROCEDURE — 83970 ASSAY OF PARATHORMONE: CPT

## 2025-08-30 PROCEDURE — 36415 COLL VENOUS BLD VENIPUNCTURE: CPT

## 2025-08-30 PROCEDURE — 84100 ASSAY OF PHOSPHORUS: CPT

## 2025-08-30 PROCEDURE — 82565 ASSAY OF CREATININE: CPT

## (undated) NOTE — LETTER
7/20/2020              [facility]        [address] [address]         To Whom it may concern:    [facility] has had tuberculin purified protein derivative (PPD) tests as listed below.     INDURATION (PPD)   Date Value Ref Ra

## (undated) NOTE — LETTER
[facility] [employer] of Child Health Examination       [facility] Name  [facility] Title                           Date     Signature HEALTH HISTORY          TO BE COMPLETED AND SIGNED BY PARENT/GUARDIAN AND VERIFIED BY HEALTH CARE PROVIDER    ALLERGIES  (Food, drug, insect, other)  Patient has no known allergies.  MEDICATION  (List all prescribed or taken on a regular basis.)    Current Date     PHYSICAL EXAMINATION REQUIREMENTS    Entire section below to be completed by MD//APN/PA       PHYSICAL EXAMINATION REQUIREMENTS (head circumference if <33 years old):   /69   Pulse 73   Temp 98 °F Eyes Yes     Screen result:   Genito-Urinary Yes  LMP   Nose Yes  Neurological Yes    Throat Yes  Musculoskeletal Yes    Mouth/Dental Yes  Spinal examination Yes    Cardiovascular/HTN Yes  Nutritional status Yes    Respiratory Yes                   Diagnos

## (undated) NOTE — LETTER
7/20/2020              Elma Phillips        [address] [facility] [address]       To whom It May Concern,    Elma Phillips is currently a patient under my medical care. He was seen in office for a physical and is in good health.  If you

## (undated) NOTE — LETTER
3/13/2018          To Whom It May Concern:    Pat Espinoza is currently under my medical care and may not return to school at this time. Please excuse [facility] Leggett for 1 days. He may return to school on 03/14/18. Activity is restricted as follows: none.

## (undated) NOTE — LETTER
7/30/2020      REGARDING:        Seth Abreu        [address] [facility] [address] [facility] [place] [address]         To whom it may concern:    Mr. Seth Abreu has been under my care since his birth.   It has been confirmed that he has adverse gastrointestin

## (undated) NOTE — MR AVS SNAPSHOT
[facility]  [address], [address]  [phone]               Thank you for choosing us for your health care visit with Benitez Munoz DO.   We are glad to serve you and happy to provide you with this sum 138 lb (62.596 kg) (71 %*, Z = 0.54) 18.98 kg/m2 (36 %*, Z = -0.36)    *Growth percentiles are based on CDC 2-20 Years data     BP percentiles are based on 2000 NHANES data         Current Medications          This list is accurate as of: 5/27/17 10:56 AM To help children live healthy active lives, parents can:  o Be role models themselves by making healthy eating and daily physical activity the norm for their family.   o Create a home where healthy choices are available and encouraged  o Make it fun – find

## (undated) NOTE — LETTER
VACCINE ADMINISTRATION RECORD  PARENT / GUARDIAN APPROVAL  Date: 2019  Vaccine administered to:  Roberto Whittaker     : 2000    MRN: [id number]    A copy of the appropriate Centers for Disease Control and Prevention Vaccine Information statement h

## (undated) NOTE — LETTER
[facility] [employer] of Child Health Examination       Student's Name  Toma Oppenheim Birth D Title                           Date     Signature HEALTH HISTORY          TO BE COMPLETED AND SIGNED BY PARENT/GUARDIAN AND VERIFIED BY HEALTH CARE PROVIDER    ALLERGIES  (Food, drug, insect, other)  Patient has no known allergies.  MEDICATION  (List all prescribed or taken on a regular basis.)    Current Date     PHYSICAL EXAMINATION REQUIREMENTS    Entire section below to be completed by MD//APN/PA       PHYSICAL EXAMINATION REQUIREMENTS (head circumference if <33 years old):   /69   Pulse 73   Temp 98 °F Eyes Yes     Screen result:   Genito-Urinary Yes  LMP   Nose Yes  Neurological Yes    Throat Yes  Musculoskeletal Yes    Mouth/Dental Yes  Spinal examination Yes    Cardiovascular/HTN Yes  Nutritional status Yes    Respiratory Yes                   Diagnos